# Patient Record
Sex: FEMALE | Race: WHITE
[De-identification: names, ages, dates, MRNs, and addresses within clinical notes are randomized per-mention and may not be internally consistent; named-entity substitution may affect disease eponyms.]

---

## 2019-09-29 ENCOUNTER — HOSPITAL ENCOUNTER (EMERGENCY)
Dept: HOSPITAL 62 - ER | Age: 24
LOS: 1 days | Discharge: HOME | End: 2019-09-30
Payer: SELF-PAY

## 2019-09-29 VITALS — SYSTOLIC BLOOD PRESSURE: 117 MMHG | DIASTOLIC BLOOD PRESSURE: 72 MMHG

## 2019-09-29 DIAGNOSIS — O26.891: ICD-10-CM

## 2019-09-29 DIAGNOSIS — Z3A.01: ICD-10-CM

## 2019-09-29 DIAGNOSIS — O21.9: ICD-10-CM

## 2019-09-29 DIAGNOSIS — O23.11: Primary | ICD-10-CM

## 2019-09-29 DIAGNOSIS — R30.9: ICD-10-CM

## 2019-09-29 LAB
ADD MANUAL DIFF: NO
ALBUMIN SERPL-MCNC: 4.3 G/DL (ref 3.5–5)
ALP SERPL-CCNC: 51 U/L (ref 38–126)
ANION GAP SERPL CALC-SCNC: 10 MMOL/L (ref 5–19)
APPEARANCE UR: (no result)
APTT PPP: YELLOW S
AST SERPL-CCNC: 19 U/L (ref 14–36)
BASOPHILS # BLD AUTO: 0 10^3/UL (ref 0–0.2)
BASOPHILS NFR BLD AUTO: 0.2 % (ref 0–2)
BILIRUB DIRECT SERPL-MCNC: 0.1 MG/DL (ref 0–0.4)
BILIRUB SERPL-MCNC: 0.2 MG/DL (ref 0.2–1.3)
BILIRUB UR QL STRIP: NEGATIVE
BUN SERPL-MCNC: 9 MG/DL (ref 7–20)
CALCIUM: 9.1 MG/DL (ref 8.4–10.2)
CHLORIDE SERPL-SCNC: 102 MMOL/L (ref 98–107)
CO2 SERPL-SCNC: 25 MMOL/L (ref 22–30)
EOSINOPHIL # BLD AUTO: 0.2 10^3/UL (ref 0–0.6)
EOSINOPHIL NFR BLD AUTO: 1.7 % (ref 0–6)
ERYTHROCYTE [DISTWIDTH] IN BLOOD BY AUTOMATED COUNT: 12.7 % (ref 11.5–14)
GLUCOSE SERPL-MCNC: 85 MG/DL (ref 75–110)
GLUCOSE UR STRIP-MCNC: NEGATIVE MG/DL
HCT VFR BLD CALC: 41.7 % (ref 36–47)
HGB BLD-MCNC: 14.3 G/DL (ref 12–15.5)
KETONES UR STRIP-MCNC: NEGATIVE MG/DL
LYMPHOCYTES # BLD AUTO: 1.7 10^3/UL (ref 0.5–4.7)
LYMPHOCYTES NFR BLD AUTO: 19.1 % (ref 13–45)
MCH RBC QN AUTO: 31.1 PG (ref 27–33.4)
MCHC RBC AUTO-ENTMCNC: 34.3 G/DL (ref 32–36)
MCV RBC AUTO: 91 FL (ref 80–97)
MONOCYTES # BLD AUTO: 0.9 10^3/UL (ref 0.1–1.4)
MONOCYTES NFR BLD AUTO: 9.7 % (ref 3–13)
NEUTROPHILS # BLD AUTO: 6.3 10^3/UL (ref 1.7–8.2)
NEUTS SEG NFR BLD AUTO: 69.3 % (ref 42–78)
NITRITE UR QL STRIP: POSITIVE
PH UR STRIP: 6 [PH] (ref 5–9)
PLATELET # BLD: 230 10^3/UL (ref 150–450)
POTASSIUM SERPL-SCNC: 3.8 MMOL/L (ref 3.6–5)
PROT SERPL-MCNC: 7 G/DL (ref 6.3–8.2)
PROT UR STRIP-MCNC: NEGATIVE MG/DL
RBC # BLD AUTO: 4.61 10^6/UL (ref 3.72–5.28)
SP GR UR STRIP: 1.03
TOTAL CELLS COUNTED % (AUTO): 100 %
UROBILINOGEN UR-MCNC: 2 MG/DL (ref ?–2)
WBC # BLD AUTO: 9.1 10^3/UL (ref 4–10.5)

## 2019-09-29 PROCEDURE — 96361 HYDRATE IV INFUSION ADD-ON: CPT

## 2019-09-29 PROCEDURE — 80053 COMPREHEN METABOLIC PANEL: CPT

## 2019-09-29 PROCEDURE — 84702 CHORIONIC GONADOTROPIN TEST: CPT

## 2019-09-29 PROCEDURE — 85025 COMPLETE CBC W/AUTO DIFF WBC: CPT

## 2019-09-29 PROCEDURE — 36415 COLL VENOUS BLD VENIPUNCTURE: CPT

## 2019-09-29 PROCEDURE — 83690 ASSAY OF LIPASE: CPT

## 2019-09-29 PROCEDURE — 96374 THER/PROPH/DIAG INJ IV PUSH: CPT

## 2019-09-29 PROCEDURE — 81001 URINALYSIS AUTO W/SCOPE: CPT

## 2019-09-29 PROCEDURE — 99284 EMERGENCY DEPT VISIT MOD MDM: CPT

## 2019-09-30 NOTE — ER DOCUMENT REPORT
ED GI/





- General


Chief Complaint: Nausea/Vomiting


Stated Complaint: VOMITING


Time Seen by Provider: 19 23:32


Notes: 





Patient is a 24-year-old female  presents to the emergency department with 

approximately 24 hours of generalized nausea and vomiting.  Reports vomit x6 in 

the past 24 hours with last episode ~1 hour ago. Denies fever, diarrhea. Report 

+preg with LMP: 2019--7w 4d per wheel. Denies vaginal bleeding, vaginal DC

. Reports normal urination with burning present. 


TRAVEL OUTSIDE OF THE [place] IN LAST 30 DAYS: No





- Related Data


Allergies/Adverse Reactions: 


                                        





No Known Allergies Allergy (Unverified 19 21:05)


   











Past Medical History





- General


Information source: Patient





- Social History


Smoking Status: Never Smoker


Frequency of alcohol use: None


Drug Abuse: None


Family History: Reviewed & Not Pertinent


Patient has suicidal ideation: No


Patient has homicidal ideation: No





Review of Systems





- Review of Systems


Constitutional: denies: Fever


EENT: No symptoms reported


Cardiovascular: No symptoms reported


Respiratory: No symptoms reported


Gastrointestinal: See HPI


Genitourinary: See HPI


Female Genitourinary: See HPI


Musculoskeletal: No symptoms reported


Skin: No symptoms reported


Hematologic/Lymphatic: No symptoms reported


Neurological/Psychological: No symptoms reported





Physical Exam





- Vital signs


Vitals: 


                                        











Temp Pulse Resp BP Pulse Ox


 


 97.9 F   84   18   133/81 H  97 


 


 19 20:52  19 20:52  19 20:52  19 20:52  19 20:52














- Notes


Notes: 





GENERAL: Alert, interacts well. No acute distress.


HEAD: Normocephalic, atraumatic.


EYES: Pupils equal, round, and reactive to light. Extraocular movements intact.


ENT: Oral mucosa moist, tongue midline. 


NECK: Full range of motion. Supple. Trachea midline.


LUNGS: Clear to auscultation bilaterally, no wheezes, rales, or rhonchi. No 

respiratory distress.


HEART: Regular rate and rhythm. No murmur


ABDOMEN: Soft, non-tender. Non-distended. Bowel sounds present in all 4 

quadrants.


EXTREMITIES: Moves all 4 extremities spontaneously. No edema, normal radial and 

dorsalis pedis pulses bilaterally. No cyanosis.


BACK: no cervical, thoracic, lumbar midline tenderness. No saddle anesthesia, 

normal distal neurovascular exam.  No CVA tenderness noted bilaterally


NEUROLOGICAL: Alert and oriented x3. Normal speech. cranial nerves II through 

XII grossly intact. 


PSYCH: Normal affect, normal mood.


SKIN: Warm, dry, normal turgor. No rashes or lesions noted.








Course





- Re-evaluation


Re-evalutation: 





Laboratory











  19





  22:07 22:07 22:10


 


WBC  9.1  


 


RBC  4.61  


 


Hgb  14.3  


 


Hct  41.7  


 


MCV  91  


 


MCH  31.1  


 


MCHC  34.3  


 


RDW  12.7  


 


Plt Count  230  


 


Lymph % (Auto)  19.1  


 


Mono % (Auto)  9.7  


 


Eos % (Auto)  1.7  


 


Baso % (Auto)  0.2  


 


Absolute Neuts (auto)  6.3  


 


Absolute Lymphs (auto)  1.7  


 


Absolute Monos (auto)  0.9  


 


Absolute Eos (auto)  0.2  


 


Absolute Basos (auto)  0.0  


 


Seg Neutrophils %  69.3  


 


Sodium   136.5 L 


 


Potassium   3.8 


 


Chloride   102 


 


Carbon Dioxide   25 


 


Anion Gap   10 


 


BUN   9 


 


Creatinine   0.53 


 


Est GFR ( Amer)   > 60 


 


Est GFR (MDRD) Non-Af   > 60 


 


Glucose   85 


 


Calcium   9.1 


 


Total Bilirubin   0.2 


 


Direct Bilirubin   0.1 


 


Neonat Total Bilirubin   Not Reportable 


 


Neonat Direct Bilirubin   Not Reportable 


 


Neonat Indirect Bili   Not Reportable 


 


AST   19 


 


ALT   13 


 


Alkaline Phosphatase   51 


 


Total Protein   7.0 


 


Albumin   4.3 


 


Lipase   59.9 


 


Beta HCG, Quant   204478.00 H 


 


Total Beta HCG   POSITIVE 


 


Urine Color    YELLOW


 


Urine Appearance    SLIGHTLY-CLOUDY


 


Urine pH    6.0


 


Ur Specific Gravity    1.026


 


Urine Protein    NEGATIVE


 


Urine Glucose (UA)    NEGATIVE


 


Urine Ketones    NEGATIVE


 


Urine Blood    NEGATIVE


 


Urine Nitrite    POSITIVE H


 


Urine Bilirubin    NEGATIVE


 


Urine Urobilinogen    2.0 H


 


Ur Leukocyte Esterase    MODERATE H


 


Urine WBC (Auto)    18


 


Urine RBC (Auto)    5


 


U Hyaline Cast (Auto)    2


 


Urine Bacteria (Auto)    3+


 


Squamous Epi Cells Auto    12


 


Urine Mucus (Auto)    MANY


 


Urine Ascorbic Acid    NEGATIVE








Initial nurses note is noting patient had abdominal cramping.  Upon my 

assessment patient is denying any abdominal pain.  She continues to deny any 

vaginal bleeding or discharge.  Patient's urine does show signs of infection.  

We will treat with Keflex.  Patient has been treated with Reglan and fluid in 

the emergency department.  Voices she overall feels "so much better."  Discussed

close follow-up with primary care provider and OB/GYN.





At this time will discharge with return precautions and follow-up recommen

dations.  Verbal discharge instructions given a the bedside and opportunity for 

questions given. Medication warnings reviewed. Patient is in agreement with this

plan and has verbalized understanding of return precautions and the need for 

primary care follow-up in the next 24-72 hours.





This medical record was dictated with voice recognizing software.  There may be 

grammatical, syntax errors that are unintended.





- Vital Signs


Vital signs: 


                                        











Temp Pulse Resp BP Pulse Ox


 


 98.0 F   72   16   117/72   98 


 


 19 23:35  19 23:35  19 23:35  19 23:35  19 23:35














- Laboratory


Result Diagrams: 


                                 19 22:07





                                 19 22:07


Laboratory results interpreted by me: 


                                        











  19





  22:07 22:10


 


Sodium  136.5 L 


 


Beta HCG, Quant  804750.00 H 


 


Urine Nitrite   POSITIVE H


 


Urine Urobilinogen   2.0 H


 


Ur Leukocyte Esterase   MODERATE H














Discharge





- Discharge


Clinical Impression: 


 Nausea/vomiting in pregnancy





Urinary tract infection


Qualifiers:


 Urinary tract infection type: acute cystitis Hematuria presence: without 

hematuria Qualified Code(s): N30.00 - Acute cystitis without hematuria





Condition: Stable


Disposition: HOME, SELF-CARE


Instructions:  Intravenous (IV) Fluids ([facility]), Urinary Tract Infection ([facility]), 

Vomiting ([facility])


Additional Instructions: 


As we discussed you have been seen and treated in the emergency department for 

your nausea and vomiting during pregnancy.  Your urine also shows signs of 

infection.  I will treat you with an antibiotic called Keflex.  For your nausea 

and vomiting while pregnant you can buy over-the-counter Unisom. It comes in 25 

mg tablets.  You can take 1/2 tablet which is 12.5 mg at bedtime daily as needed

for nausea.  This medication may make you drowsy it is a sleep aid.  You can 

also take Pyridoxine (Vitamin B6) comes in 25 mg tablets.  Take 1 tablet every 8

hours as needed for nausea.  Other options: Acupuncture wrist bands (called 

Seabandz) apply pressure to the wrist.  Some people find these help, you can 

find them at the drugstore.





Please follow-up with your primary care provider in the next 24 to 48 hours.  

Return to the emergency room for any concerns.


Prescriptions: 


Cephalexin Monohydrate [Keflex 500 mg Capsule] 500 mg PO BID 7 Days #14 capsule


Forms:  Return to Work